# Patient Record
Sex: FEMALE | Race: WHITE | Employment: FULL TIME | URBAN - NONMETROPOLITAN AREA
[De-identification: names, ages, dates, MRNs, and addresses within clinical notes are randomized per-mention and may not be internally consistent; named-entity substitution may affect disease eponyms.]

---

## 2017-10-29 ENCOUNTER — HOSPITAL ENCOUNTER (EMERGENCY)
Age: 52
Discharge: HOME | End: 2017-10-29
Attending: EMERGENCY MEDICINE
Payer: COMMERCIAL

## 2017-10-29 VITALS
BODY MASS INDEX: 20.18 KG/M2 | HEART RATE: 63 BPM | WEIGHT: 121.25 LBS | TEMPERATURE: 97.8 F | OXYGEN SATURATION: 100 % | RESPIRATION RATE: 16 BRPM | SYSTOLIC BLOOD PRESSURE: 127 MMHG | DIASTOLIC BLOOD PRESSURE: 64 MMHG
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
## 2017-10-29 PROCEDURE — 99283 EMERGENCY DEPT VISIT LOW MDM: CPT

## 2017-10-29 PROCEDURE — 90715 TDAP VACCINE 7 YRS/> IM: CPT | Performed by: EMERGENCY MEDICINE

## 2017-10-29 PROCEDURE — 90471 IMMUNIZATION ADMIN: CPT | Performed by: EMERGENCY MEDICINE

## 2017-10-29 PROCEDURE — 6370000000 HC RX 637 (ALT 250 FOR IP): Performed by: EMERGENCY MEDICINE

## 2017-10-29 PROCEDURE — 6360000002 HC RX W HCPCS: Performed by: EMERGENCY MEDICINE

## 2017-10-29 RX ORDER — AMOXICILLIN AND CLAVULANATE POTASSIUM 500; 125 MG/1; MG/1
1 TABLET, FILM COATED ORAL 3 TIMES DAILY
Qty: 21 TABLET | Refills: 0 | Status: SHIPPED | OUTPATIENT
Start: 2017-10-29 | End: 2017-11-05

## 2017-10-29 RX ORDER — AMOXICILLIN AND CLAVULANATE POTASSIUM 500; 125 MG/1; MG/1
1 TABLET, FILM COATED ORAL ONCE
Status: COMPLETED | OUTPATIENT
Start: 2017-10-29 | End: 2017-10-29

## 2017-10-29 RX ADMIN — AMOXICILLIN AND CLAVULANATE POTASSIUM 1 TABLET: 500; 125 TABLET, FILM COATED ORAL at 18:10

## 2017-10-29 RX ADMIN — TETANUS TOXOID, REDUCED DIPHTHERIA TOXOID AND ACELLULAR PERTUSSIS VACCINE, ADSORBED 0.5 ML: 5; 2.5; 8; 8; 2.5 SUSPENSION INTRAMUSCULAR at 18:10

## 2017-10-29 ASSESSMENT — PAIN DESCRIPTION - ONSET: ONSET: SUDDEN

## 2017-10-29 ASSESSMENT — PAIN DESCRIPTION - ORIENTATION: ORIENTATION: LOWER;LEFT

## 2017-10-29 ASSESSMENT — PAIN DESCRIPTION - FREQUENCY: FREQUENCY: CONTINUOUS

## 2017-10-29 ASSESSMENT — PAIN DESCRIPTION - DESCRIPTORS: DESCRIPTORS: BURNING

## 2017-10-29 ASSESSMENT — PAIN DESCRIPTION - DIRECTION: RADIATING_TOWARDS: DOWN TO FINGERS

## 2017-10-29 ASSESSMENT — PAIN DESCRIPTION - PROGRESSION: CLINICAL_PROGRESSION: NOT CHANGED

## 2017-10-29 ASSESSMENT — PAIN SCALES - GENERAL: PAINLEVEL_OUTOF10: 7

## 2017-10-29 ASSESSMENT — PAIN DESCRIPTION - PAIN TYPE: TYPE: ACUTE PAIN

## 2017-10-29 ASSESSMENT — PAIN DESCRIPTION - LOCATION: LOCATION: ARM

## 2017-10-29 NOTE — ED NOTES
Has 4 puncture wounds on left lower and upper forearm. Bit by boyfriend's dog.      Carlene Kee RN  10/29/17 2501

## 2017-10-29 NOTE — ED PROVIDER NOTES
eMERGENCY dEPARTMENT eNCOUnter        [facility]    Chief Complaint   Patient presents with    Animal Bite     bit by dog about 1 hour ago       HPI    Rosalind Blanchard is a 50 y.o. female who presents to ED from home. By car. With complaint of L arm dog bite. Onset 1 hour prior to arrival.  Patient was bit by her dog. Intensity of symptoms moderate pain  Location of symptoms left arm      REVIEW OF SYSTEMS    All systems reviewed and positives are in the HPI      PAST MEDICAL HISTORY    Past Medical History:   Diagnosis Date    Headache(784.0)        SURGICAL HISTORY    Past Surgical History:   Procedure Laterality Date    BREAST ENHANCEMENT SURGERY Bilateral        CURRENT MEDICATIONS    Current Outpatient Rx   Medication Sig Dispense Refill    amoxicillin-clavulanate (AUGMENTIN) 500-125 MG per tablet Take 1 tablet by mouth 3 times daily for 7 days 21 tablet 0    topiramate (TOPAMAX) 50 MG tablet Take 50 mg by mouth 2 times daily       zolpidem (AMBIEN) 5 MG tablet Take 5 mg by mouth nightly as needed for Sleep.  SUMAtriptan Succinate (IMITREX PO) Take by mouth as needed          ALLERGIES    No Known Allergies    FAMILY HISTORY    History reviewed. No pertinent family history.     SOCIAL HISTORY    Social History     Social History    Marital status: Single     Spouse name: N/A    Number of children: N/A    Years of education: N/A     Social History Main Topics    Smoking status: Former Smoker     Packs/day: 0.50     Years: 1.00     Types: Cigarettes     Quit date: 10/29/2012    Smokeless tobacco: Never Used    Alcohol use Yes      Comment: 1 beer once a weekend or occasional glass of wine    Drug use: No    Sexual activity: Not Asked     Other Topics Concern    None     Social History Narrative    None       PHYSICAL EXAM    VITAL SIGNS: /64   Pulse 63   Temp 97.8 °F (36.6 °C) (Oral)   Resp 16   Wt 121 lb 4.1 oz (55 kg)   LMP 10/15/2017 (Approximate)   SpO2 100% Breastfeeding? No   BMI 20.18 kg/m²   Constitutional:  Well developed, well nourished, no acute distress, non-toxic appearance   HENT:  Atraumatic, external ears normal, nose normal, oropharynx moist.  Neck- normal range of motion, no tenderness, supple   Respiratory:  No respiratory distress, normal breath sounds. Cardiovascular:  Normal rate, normal rhythm, no murmurs, no gallops, no rubs   GI:  Soft, nondistended, normal bowel sounds, nontender   Musculoskeletal:  Left arm with some dorsal and volar forearm dog bite puncture wounds of the dorsal and volar aspect of the left forearm 3 puncture wounds. Integument:  Well hydrated, no rash   Neurologic:  Negative. RADIOLOGY/PROCEDURES    No orders to display         Labs  Labs Reviewed - No data to display          Summation      Patient Course: The wounds will be cleaned patient is sent home on the mentum. The warning signs were discussed the wound care instructions were given. ED Medications administered this visit:    Medications   amoxicillin-clavulanate (AUGMENTIN) 500-125 MG per tablet 1 tablet (1 tablet Oral Given 10/29/17 1810)   Tetanus-Diphth-Acell Pertussis (BOOSTRIX) injection 0.5 mL (0.5 mLs Intramuscular Given 10/29/17 1810)       New Prescriptions from this visit:    New Prescriptions    AMOXICILLIN-CLAVULANATE (AUGMENTIN) 500-125 MG PER TABLET    Take 1 tablet by mouth 3 times daily for 7 days       Follow-up:  Mariajose Beach DO   [address] [address] [phone]      As needed, If symptoms worsen        Final Impression:   1.  Dog bite, initial encounter               (Please note that portions of this note were completed with a voice recognition program.  Efforts were made to edit the dictations but occasionally words are mis-transcribed.)        Volodymyr Murguia MD  10/29/17 1812       Volodymyr Murguia MD  10/29/17 1814

## 2022-07-08 ENCOUNTER — HOSPITAL ENCOUNTER (OUTPATIENT)
Dept: NON INVASIVE DIAGNOSTICS | Age: 57
Discharge: HOME | End: 2022-07-08
Payer: COMMERCIAL

## 2022-07-08 DIAGNOSIS — R07.89 ATYPICAL CHEST PAIN: ICD-10-CM

## 2022-07-08 PROCEDURE — 93017 CV STRESS TEST TRACING ONLY: CPT

## 2022-07-11 NOTE — PROCEDURES
[address] [place], [place] [address]                              CARDIAC STRESS TEST    PATIENT NAME: Bharat Stroud                    :        1965  MED REC NO:   [id number]                              ROOM:  ACCOUNT NO:   [de-identified]                           ADMIT DATE: 2022  PROVIDER:     Sorin Garcia MD    CARDIOVASCULAR DIAGNOSTIC DEPARTMENT    DATE OF STUDY:  2022    ORDERING PROVIDER:  Eddie Breaux MD    PRIMARY CARE PROVIDER:  Kristina Slaughter DO    INTERPRETING PHYSICIAN:  Sorin Garcia MD    EXERCISE STRESS TEST REPORT    Stress, exercise stress. INDICATIONS:  Assessment of recent chest pain. CLINICAL HISTORY:  The patient is a 62-year-old woman with no known  coronary artery disease. Previous cardiac history includes:  None. Other previous history includes:  Chest pain, caffeine, family history  of coronary artery disease <60 in father. Symptoms just prior to testing include:  None. Relevant medications:  None. PROCEDURE:  The patient performed treadmill exercise using a Andrey  protocol, completing 10:09 minutes and completing an estimated workload  of 11.82 metabolic equivalents (METS). The test was terminated due to fatigue and shortness of breath. The heart rate was 54 beats per minute at baseline and increased to 153  beats at peak exercise, which was 92% of the maximum predicted heart  rate. The rest blood pressure was 126/70 mm/Hg and increased to 178/80  mm/Hg, which is a normal response. During the procedure, the patient  developed fatigue, shortness of breath and leg fatigue, but denied chest  discomfort. STRESS ECG RESULTS:  The resting electrocardiogram demonstrated sinus  bradycardia without definitive ST-segment abnormalities suggestive of  myocardial ischemia.     At peak exercise and during recovery, the patient developed:    Up-sloping ST segment changes in leads